# Patient Record
Sex: FEMALE | URBAN - METROPOLITAN AREA
[De-identification: names, ages, dates, MRNs, and addresses within clinical notes are randomized per-mention and may not be internally consistent; named-entity substitution may affect disease eponyms.]

---

## 2020-03-11 ENCOUNTER — IMPORTED ENCOUNTER (OUTPATIENT)
Dept: URBAN - METROPOLITAN AREA CLINIC 9 | Facility: CLINIC | Age: 65
End: 2020-03-11

## 2021-03-08 ENCOUNTER — IMPORTED ENCOUNTER (OUTPATIENT)
Dept: URBAN - METROPOLITAN AREA CLINIC 9 | Facility: CLINIC | Age: 66
End: 2021-03-08

## 2021-10-16 ASSESSMENT — TONOMETRY
OD_IOP_MMHG: 12
OS_IOP_MMHG: 12
OS_IOP_MMHG: 13
OD_IOP_MMHG: 15

## 2021-10-16 ASSESSMENT — VISUAL ACUITY
OD_CC: 20/25 + SN
OD_CC: 20/25 -2 SN
OS_CC: 20/25 + SN
OD_CC: 20/25 SN
OS_CC: 20/25 SN
OD_CC: 20/25 SN
OS_CC: 20/25 -2 SN
OS_CC: 20/25 SN

## 2022-05-11 ENCOUNTER — HOSPITAL ENCOUNTER (EMERGENCY)
Facility: HOSPITAL | Age: 67
Discharge: HOME | End: 2022-05-11
Attending: EMERGENCY MEDICINE
Payer: MEDICARE

## 2022-05-11 VITALS
HEIGHT: 64 IN | RESPIRATION RATE: 18 BRPM | OXYGEN SATURATION: 98 % | HEART RATE: 89 BPM | DIASTOLIC BLOOD PRESSURE: 92 MMHG | BODY MASS INDEX: 31.09 KG/M2 | TEMPERATURE: 97.9 F | WEIGHT: 182.1 LBS | SYSTOLIC BLOOD PRESSURE: 133 MMHG

## 2022-05-11 DIAGNOSIS — D86.9 SARCOIDOSIS: ICD-10-CM

## 2022-05-11 DIAGNOSIS — L98.9 HAND LESION: Primary | ICD-10-CM

## 2022-05-11 DIAGNOSIS — T50.905A ADVERSE EFFECT OF DRUG, INITIAL ENCOUNTER: ICD-10-CM

## 2022-05-11 PROCEDURE — 99281 EMR DPT VST MAYX REQ PHY/QHP: CPT | Performed by: EMERGENCY MEDICINE

## 2022-05-11 RX ORDER — PREDNISONE 10 MG/1
1 TABLET ORAL ONCE
Status: COMPLETED | OUTPATIENT
Start: 2022-05-11 | End: 2022-05-11

## 2022-05-11 RX ORDER — CLOTRIMAZOLE 10 MG/1
10 LOZENGE ORAL
COMMUNITY

## 2022-05-11 RX ADMIN — PREDNISONE 1 PACKAGE: 10 TABLET ORAL at 08:04

## 2022-05-11 ASSESSMENT — ENCOUNTER SYMPTOMS
FEVER: 0
LIGHT-HEADEDNESS: 0
VOMITING: 0
BACK PAIN: 0
COLOR CHANGE: 0
SEIZURES: 0
DIFFICULTY URINATING: 0
HEADACHES: 0
BRUISES/BLEEDS EASILY: 0
SORE THROAT: 0
CONFUSION: 0
DYSURIA: 0
NERVOUS/ANXIOUS: 0
DIARRHEA: 0
CHILLS: 0
COUGH: 0
ABDOMINAL PAIN: 0
SHORTNESS OF BREATH: 0
NECK PAIN: 0
NAUSEA: 0

## 2022-05-11 NOTE — DISCHARGE INSTRUCTIONS
Please take the provided steroids for 5 full days and follow-up with your PCP when you get back home.  If the rash seems to be spreading and is not improving please visit your nearest urgent care or emergency department.

## 2022-05-11 NOTE — ED PROVIDER NOTES
HPI:  Chief Complaint   Patient presents with   • Blister     Pt arrives through triage for blisters/ rash to bilateral hands. Pt was just started on Clotrimazole 10mg for thrush this past Sunday. Pt denies any other symptoms.      HPI  Patient here with a rash or blisters to both hands.  She is passing through the area.  She was started on clotrimazole a few days ago due to possible thrush.  Yesterday started noticing these reddish lesions on her hands with a couple on her arms as well.  They hurt a little bit, no itching.  She has a history of sarcoidosis.  Denies any fevers, nausea or vomiting, no cough and no shortness of breath.    HISTORY:  Past Medical History:   Diagnosis Date   • Sarcoidosis of lung (CMS/HCC) (HCC)        Past Surgical History:   Procedure Laterality Date   • CHOLECYSTECTOMY     • HYSTERECTOMY         History reviewed. No pertinent family history.    Social History     Tobacco Use   • Smoking status: Never Smoker   • Smokeless tobacco: Never Used       ROS:  Review of Systems   Constitutional: Negative for chills and fever.   HENT: Negative for congestion and sore throat.    Respiratory: Negative for cough and shortness of breath.    Cardiovascular: Negative for chest pain.   Gastrointestinal: Negative for abdominal pain, diarrhea, nausea and vomiting.   Genitourinary: Negative for difficulty urinating and dysuria.   Musculoskeletal: Negative for back pain and neck pain.   Skin: Positive for rash. Negative for color change.   Allergic/Immunologic: Negative for immunocompromised state.   Neurological: Negative for seizures, syncope, light-headedness and headaches.   Hematological: Does not bruise/bleed easily.   Psychiatric/Behavioral: Negative for confusion. The patient is not nervous/anxious.    All other systems reviewed and are negative.       PHYSICAL EXAM:  Physical Exam  Vitals and nursing note reviewed.   Constitutional:       Appearance: She is well-developed. She is not  toxic-appearing or diaphoretic.   HENT:      Head: Normocephalic and atraumatic.      Nose: Nose normal.      Mouth/Throat:      Mouth: Mucous membranes are moist.      Pharynx: Oropharynx is clear.   Eyes:      Extraocular Movements: Extraocular movements intact.      Conjunctiva/sclera: Conjunctivae normal.   Cardiovascular:      Rate and Rhythm: Normal rate and regular rhythm.      Pulses: Normal pulses.   Pulmonary:      Effort: Pulmonary effort is normal. No respiratory distress.      Breath sounds: Normal breath sounds. No wheezing.   Musculoskeletal:         General: Normal range of motion.      Cervical back: Normal range of motion.      Right lower leg: No edema.      Left lower leg: No edema.   Skin:     General: Skin is warm and dry.      Comments: Bilateral hands with a few scattered reddish lesions, 2 on the fingers, 1 or 2 scattered on the forearms.  Nonblanchable, mildly tender   Neurological:      General: No focal deficit present.      Mental Status: She is alert and oriented to person, place, and time.   Psychiatric:         Mood and Affect: Mood normal.         Behavior: Behavior normal.            Labs Reviewed - No data to display    No orders to display       ED Medication Administration from 05/11/2022 0712 to 05/11/2022 0820       Date/Time Order Dose Route Action Action by     05/11/2022 0804 predniSONE (DELTASONE) 10 mg Disp: #30 tablet - ED DOSE PACK 1 Package 1 Package oral ED take home pack NEO Soni          PROCEDURES:  Procedures    MDM:     Jeny Zapata is a 67 y.o. female here for rash.    • Lesions appear more vasculitic in nature, she does have a history of sarcoidosis.  She may be having an allergic reaction, lower suspicion for any lung pathology, no sore throat, no current signs of thrush and no cough or signs of illness.  Will give a prescription for prednisone for treatment of possible allergic reaction and some suspicion for sarcoid flareup, and recommend she follow-up  with her PCP when she gets back home.    ED COURSE:            Sepsis Quality Bundle        CLINICAL IMPRESSION:  Final diagnoses:   [L98.9] Hand lesion   [D86.9] Sarcoidosis   [T50.623I] Adverse effect of drug, initial encounter       Comprehensive evaluation performed, prior and current notes, labs, and imaging reviewed and updated.  Discussed findings, diagnosis, and treatment plan in detail with patient or caregiver who is in agreement with plan.  Patient is stable with no further interventions indicated in the emergency department at this time, discharged in stable condition with return precautions and follow-up instructions given.        A voice recognition program was used to aid in medical record documentation. Some words may be printed not exactly as they were spoken. Efforts were made to carefully edit and correct any inaccuracies; however, some errors may be present.       Wesley Borden DO  05/11/22 1922

## 2023-09-01 ENCOUNTER — NEW PATIENT (OUTPATIENT)
Dept: URBAN - METROPOLITAN AREA CLINIC 4 | Facility: CLINIC | Age: 68
End: 2023-09-01

## 2023-09-01 DIAGNOSIS — H43.813: ICD-10-CM

## 2023-09-01 DIAGNOSIS — H25.13: ICD-10-CM

## 2023-09-01 PROCEDURE — 92134 CPTRZ OPH DX IMG PST SGM RTA: CPT

## 2023-09-01 PROCEDURE — 92015 DETERMINE REFRACTIVE STATE: CPT

## 2023-09-01 PROCEDURE — 92014 COMPRE OPH EXAM EST PT 1/>: CPT

## 2023-09-01 ASSESSMENT — TONOMETRY
OD_IOP_MMHG: 17
OS_IOP_MMHG: 15

## 2024-11-07 ENCOUNTER — COMPREHENSIVE EXAM (OUTPATIENT)
Facility: LOCATION | Age: 69
End: 2024-11-07

## 2024-11-07 DIAGNOSIS — H43.813: ICD-10-CM

## 2024-11-07 DIAGNOSIS — D86.9: ICD-10-CM

## 2024-11-07 DIAGNOSIS — H40.012: ICD-10-CM

## 2024-11-07 DIAGNOSIS — H25.13: ICD-10-CM

## 2024-11-07 PROCEDURE — 92014 COMPRE OPH EXAM EST PT 1/>: CPT

## 2024-11-07 PROCEDURE — 92133 CPTRZD OPH DX IMG PST SGM ON: CPT

## 2024-11-07 PROCEDURE — 92015 DETERMINE REFRACTIVE STATE: CPT

## 2024-11-20 ENCOUNTER — PRE-OP/H&P (OUTPATIENT)
Facility: LOCATION | Age: 69
End: 2024-11-20

## 2024-11-20 DIAGNOSIS — H25.13: ICD-10-CM

## 2024-11-20 PROCEDURE — 92136 OPHTHALMIC BIOMETRY: CPT

## 2024-11-20 PROCEDURE — 99211PRE PRE OP VISIT

## 2024-12-10 ENCOUNTER — SURGERY/PROCEDURE (OUTPATIENT)
Age: 69
End: 2024-12-10

## 2024-12-10 DIAGNOSIS — H25.13: ICD-10-CM

## 2024-12-10 PROBLEM — Z96.1: Noted: 2024-12-10

## 2024-12-10 PROCEDURE — 66984 XCAPSL CTRC RMVL W/O ECP: CPT

## 2024-12-11 ENCOUNTER — POST-OP (OUTPATIENT)
Age: 69
End: 2024-12-11

## 2024-12-11 DIAGNOSIS — Z96.1: ICD-10-CM

## 2024-12-11 PROCEDURE — 99024 POSTOP FOLLOW-UP VISIT: CPT

## 2024-12-19 ENCOUNTER — POST OP/EVAL OF SECOND EYE (OUTPATIENT)
Age: 69
End: 2024-12-19

## 2024-12-19 DIAGNOSIS — Z96.1: ICD-10-CM

## 2024-12-31 ENCOUNTER — POST-OP (OUTPATIENT)
Age: 69
End: 2024-12-31

## 2024-12-31 ENCOUNTER — SURGERY/PROCEDURE (OUTPATIENT)
Age: 69
End: 2024-12-31

## 2024-12-31 DIAGNOSIS — Z96.1: ICD-10-CM

## 2024-12-31 DIAGNOSIS — H25.13: ICD-10-CM

## 2024-12-31 PROCEDURE — 99024 POSTOP FOLLOW-UP VISIT: CPT

## 2024-12-31 PROCEDURE — 66984 XCAPSL CTRC RMVL W/O ECP: CPT | Mod: 79,LT

## 2025-01-14 ENCOUNTER — POST-OP (OUTPATIENT)
Age: 70
End: 2025-01-14

## 2025-01-14 DIAGNOSIS — Z96.1: ICD-10-CM

## 2025-01-14 PROCEDURE — 99024 POSTOP FOLLOW-UP VISIT: CPT

## 2025-03-19 ENCOUNTER — EMERGENCY VISIT (OUTPATIENT)
Age: 70
End: 2025-03-19

## 2025-03-19 DIAGNOSIS — D86.9: ICD-10-CM

## 2025-03-19 PROCEDURE — 92014 COMPRE OPH EXAM EST PT 1/>: CPT

## 2025-06-23 ENCOUNTER — EMERGENCY VISIT (OUTPATIENT)
Age: 70
End: 2025-06-23

## 2025-06-23 DIAGNOSIS — H02.832: ICD-10-CM

## 2025-06-23 DIAGNOSIS — H02.835: ICD-10-CM

## 2025-06-23 PROCEDURE — 92012 INTRM OPH EXAM EST PATIENT: CPT
